# Patient Record
Sex: MALE | Race: WHITE
[De-identification: names, ages, dates, MRNs, and addresses within clinical notes are randomized per-mention and may not be internally consistent; named-entity substitution may affect disease eponyms.]

---

## 2018-08-14 ENCOUNTER — HOSPITAL ENCOUNTER (EMERGENCY)
Dept: HOSPITAL 41 - JD.ED | Age: 58
Discharge: HOME | End: 2018-08-14
Payer: COMMERCIAL

## 2018-08-14 DIAGNOSIS — Z23: ICD-10-CM
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
## 2018-08-14 DIAGNOSIS — S51.011A: ICD-10-CM

## 2018-08-14 DIAGNOSIS — S42.434B: Primary | ICD-10-CM

## 2018-08-14 NOTE — EDM.PDOC
ED HPI GENERAL MEDICAL PROBLEM





- General


Chief Complaint: Laceration


Stated Complaint: FELL OFF LADDER /BLEEDING


Time Seen by Provider: 08/14/18 16:07


Source of Information: Reports: Patient


History Limitations: Reports: No Limitations





- History of Present Illness


INITIAL COMMENTS - FREE TEXT/NARRATIVE: 





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 

work with a sledge hammer.  He was up on about 2 to 3 steps and he swung the 

hammer and what he hit gave way and he fell on some bricks on his right arm.  

He did not hit his head or hurt his head.  He has no chest pain or abdominal 

pain.  He does have a large 20cm angled laceration to the right lateral elbow.  

He is right handed and his tetanus is not up to date.


Onset: Sudden


Duration: Minutes:


Location: Reports: Upper Extremity, Right (lateral elbow)


Quality: Reports: Sharp


Severity: Moderate


Improves with: Reports: Immobilization


Worsens with: Reports: Movement


Context: Reports: Trauma (fell off a ladder 2 to 3 steps up)


Associated Symptoms: Reports: No Other Symptoms


  ** Right Elbow


Pain Score (Numeric/FACES): 10





- Related Data


 Allergies











Allergy/AdvReac Type Severity Reaction Status Date / Time


 


No Known Allergies Allergy   Verified 08/14/18 16:03











Home Meds: 


 Home Meds





cephALEXin [Keflex] 500 mg PO Q6H #40 cap 08/14/18 [Rx]











Past Medical History





- Past Health History


Medical/Surgical History: Denies Medical/Surgical History





Social & Family History





- Tobacco Use


Smoking Status *Q: Never Smoker


Second Hand Smoke Exposure: No





- Caffeine Use


Caffeine Use: Reports: Coffee





- Recreational Drug Use


Recreational Drug Use: No





ED ROS GENERAL





- Review of Systems


Review Of Systems: See Below


Constitutional: Reports: No Symptoms


HEENT: Reports: No Symptoms


Respiratory: Reports: No Symptoms


Cardiovascular: Reports: No Symptoms


Endocrine: Reports: No Symptoms


GI/Abdominal: Reports: No Symptoms


: Reports: No Symptoms


Musculoskeletal: Reports: Other (Laceration to the right lateral elbow)





ED EXAM, SKIN/RASH


Exam: See Below


Exam Limited By: No Limitations


General Appearance: Alert, No Apparent Distress


Ears: Normal External Exam


Nose: Normal Inspection


Throat/Mouth: Normal Inspection


Head: Atraumatic, Normocephalic


Neck: Normal Inspection, Supple, Non-Tender


Respiratory/Chest: No Respiratory Distress, Lungs Clear, Normal Breath Sounds


Cardiovascular: Regular Rate, Rhythm, No Edema, No Murmur


GI/Abdominal: Soft, Non-Tender, No Organomegaly, No Mass


Back Exam: Normal Inspection


Extremities: Other (20 cm angled laceration to the right lateral elbow with 

exposed muscle.  Good sensation and pulses distally.)





ED SKIN PROCEDURES





- Laceration/Wound Repair


  ** Right Elbow


Lac/Wound length In cm: 20


Appearance: Mildly Contaminated, Other (angled)


Distal NVT: Neuro & Vascular Intact, No Tendon Injury


Anesthetic Type: Local


Local Anesthesia - Lidocaine (Xylocaine): 1% with EPI


Local Anesthetic Volume: Other (10)


Skin Prep: Saline


Saline Irrigation (cc's): 100


Exploration/Debridement/Repair: Wound Explored, In a Bloodless Field, Explored 

to Base, Minimal Debridement


Closed with: Sutures


Suture Size: 3-0


# of Sutures: 12


Suture Type: Nylon, Interrupted, Simple


Tetanus Status Addressed: Yes


Complications: No





- Splinting


  ** Right Upper Extremity


Splint Site: Right elbow


Pre-Procedure NV Status: Normal


Post-Procedure NV Status: Normal


Splint Material: Fiberglass


Splint Design: Gutter (long arm), Sling


Applied & Form Fitted By: Provider


Provider Post-Splint Application NV Check: NV Status Normal, Good Position


Complications: No





Course





- Vital Signs


Last Recorded V/S: 





 Last Vital Signs











Temp  97.9 F   08/14/18 15:59


 


Pulse  66   08/14/18 15:59


 


Resp  16   08/14/18 15:59


 


BP  121/75   08/14/18 15:59


 


Pulse Ox  96   08/14/18 15:59














- Orders/Labs/Meds


Orders: 





 Active Orders 24 hr











 Category Date Time Status


 


 Vaccines to be Administered [RC] PER UNIT ROUTINE Care  08/14/18 16:17 Active











Meds: 





Medications














Discontinued Medications














Generic Name Dose Route Start Last Admin





  Trade Name Freq  PRN Reason Stop Dose Admin


 


Diphtheria/Tetanus/Acell Pertussis  0.5 ml  08/14/18 16:17  08/14/18 16:30





  Adacel  IM  08/14/18 16:18  0.5 ml





  .ONCE ONE   Administration





     





     





     





     


 


Lidocaine/Epinephrine  20 ml  08/14/18 16:17  08/14/18 16:45





  Xylocaine 1% With Epinephrine 1:100,000  INJECT  08/14/18 16:18  20 ml





  ONETIME ONE   Administration





     





     





     





     














- Re-Assessments/Exams


Free Text/Narrative Re-Assessment/Exam: 





08/14/18 18:13


I ordered an x-ray of his elbow and up dated his tetanus.  The x-ray shows soft 

tissue injury and small cortical fracture fragments off the lateral epicondyle.

  This is an open fracture.  I called Dr Isackson and he recommended I irrigate 

it here and get him on some keflex.  I will also splint his arm.  I was going 

to have him follow up with Dr Isackson on Monday but he is from [facility] and 

he thinks he will be there on Monday.  He is just here working.





Departure





- Departure


Time of Disposition: 18:20


Disposition: Home, Self-Care 01


Condition: Good


Clinical Impression: 


Fall


Qualifiers:


 Encounter type: initial encounter Qualified Code(s): W19.XXXA - Unspecified 

fall, initial encounter





Laceration of right elbow


Qualifiers:


 Encounter type: initial encounter Qualified Code(s): S51.011A - Laceration 

without foreign body of right elbow, initial encounter





Fracture of lateral epicondyle of humerus


Qualifiers:


 Encounter type: initial encounter Fracture type: open Fracture morphology: 

unspecified fracture morphology Fracture alignment: nondisplaced Laterality: 

right Qualified Code(s): S42.434B - Nondisplaced fracture (avulsion) of lateral 

epicondyle of right humerus, initial encounter for open fracture








- Discharge Information


*PRESCRIPTION DRUG MONITORING PROGRAM REVIEWED*: Not Applicable


*COPY OF PRESCRIPTION DRUG MONITORING REPORT IN PATIENT SAMUEL: Not Applicable


Prescriptions: 


cephALEXin [Keflex] 500 mg PO Q6H #40 cap


Referrals: 


PCP,None [Primary Care Provider] - 


Isackson,Ronald D, MD [Physician] - 1 Week


Forms:  ED Department Discharge, ED Return to Work/School Form


Additional Instructions: 


Ice your elbow for 15 minutes 3 times per day for 2 days.  Do not change the 

dressing for 24 hours.  After that take the splint off and clean the wound with 

warm soapy water 2 times per day and apply antibiotic ointment after.  Follow 

up with Dr Isackson on Monday or another orthopedic surgeon in [place].  Take the 

keflex 4 times per day for 10 days.  Take tylenol or motrin for pain.  Please 

return if you are worse.





- My Orders


Last 24 Hours: 





My Active Orders





08/14/18 16:17


Vaccines to be Administered [RC] PER UNIT ROUTINE 














- Assessment/Plan


Last 24 Hours: 





My Active Orders





08/14/18 16:17


Vaccines to be Administered [RC] PER UNIT ROUTINE

## 2018-08-14 NOTE — CR
Right elbow:  Four views of the right elbow were obtained.

 

Soft tissue injury is identified laterally.  Small cortical fracture 

fragments are seen off the lateral epicondyle.  No additional fracture

 or other bony abnormality is seen.

 

Impression:

1.  Soft tissue injury and small cortical fracture fragments off the 

lateral epicondyle.

 

Diagnostic code #3 22